# Patient Record
(demographics unavailable — no encounter records)

---

## 2025-03-17 NOTE — HISTORY OF PRESENT ILLNESS
[FreeTextEntry1] : Nena is a pleasant 90-year-old female here for follow up evaluation.  She has lived in [place], though moved to [place] in August of 2023.  Per records, she has a history of aortic stenosis, CAD, carotid disease, hypertension, and hyperlipidemia.  Her blood pressure has been well-controlled on amlodipine 5, hydrochlorothiazide 12.5, losartan 50, and Toprol-XL 25.  She also takes rosuvastatin 5, and a baby aspirin.  She was on Ranexa at a point in time for unclear reason, though this was discontinued in 2019.  She had an echocardiogram but a year ago which demonstrated an EF of 65%, with mild MR, moderate TR, and mild aortic stenosis.  She feels well overall.  She is not active, and afraid of falls.  She has been walking around. She has no chest pain, difficulty breathing, or palpitations.  She also denies lower extremity swelling, orthopnea, PND, dizziness, lightheadedness, and near syncope. Echocardiogram with normal LV function, mild to moderate AS, moderate TR. Carotids with moderate plaque bilaterally.

## 2025-03-17 NOTE — PHYSICAL EXAM
[Well Developed] : well developed [Well Nourished] : well nourished [No Acute Distress] : no acute distress [Normal Conjunctiva] : normal conjunctiva [Normal Venous Pressure] : normal venous pressure [No Carotid Bruit] : no carotid bruit [Normal S1, S2] : normal S1, S2 [No Rub] : no rub [No Gallop] : no gallop [Clear Lung Fields] : clear lung fields [Good Air Entry] : good air entry [No Respiratory Distress] : no respiratory distress  [Soft] : abdomen soft [Non Tender] : non-tender [No Masses/organomegaly] : no masses/organomegaly [Normal Bowel Sounds] : normal bowel sounds [Normal Gait] : normal gait [No Edema] : no edema [No Cyanosis] : no cyanosis [No Clubbing] : no clubbing [No Varicosities] : no varicosities [No Rash] : no rash [No Skin Lesions] : no skin lesions [Moves all extremities] : moves all extremities [No Focal Deficits] : no focal deficits [Normal Speech] : normal speech [Alert and Oriented] : alert and oriented [Normal memory] : normal memory [de-identified] : +SM at ru/venub

## 2025-03-17 NOTE — DISCUSSION/SUMMARY
[FreeTextEntry1] : From a cardiovascular perspective, Nena is doing well.  Her exercise tolerance seems to be limited by balance, rather than dyspnea.  Her blood pressure is at goal, on her current regimen, which she will continue.  Her EKG demonstrates a sinus rhythm, without obvious ischemia or chamber enlargement.  Her physical exam is notable for a systolic murmur at her right/left upper sternal border.  Her last echocardiogram did confirm mild to moderate aortic stenosis, along with at least moderate TR.  She needs to be more active, and will avoid falls. She will keep an eye on her weights at home. She is euvolemic today without edema. We will repeat an echocardiogram and carotid doppler this year. If no issues, I will see her again in 6 months.  [EKG obtained to assist in diagnosis and management of assessed problem(s)] : EKG obtained to assist in diagnosis and management of assessed problem(s)